# Patient Record
Sex: MALE | Race: WHITE | NOT HISPANIC OR LATINO | Employment: OTHER | URBAN - METROPOLITAN AREA
[De-identification: names, ages, dates, MRNs, and addresses within clinical notes are randomized per-mention and may not be internally consistent; named-entity substitution may affect disease eponyms.]

---

## 2017-01-13 ENCOUNTER — OFFICE VISIT (OUTPATIENT)
Dept: INFECTIOUS DISEASES | Facility: CLINIC | Age: 72
End: 2017-01-13
Payer: COMMERCIAL

## 2017-01-13 VITALS
HEART RATE: 80 BPM | WEIGHT: 235.69 LBS | HEIGHT: 69 IN | TEMPERATURE: 98 F | BODY MASS INDEX: 34.91 KG/M2 | DIASTOLIC BLOOD PRESSURE: 83 MMHG | SYSTOLIC BLOOD PRESSURE: 128 MMHG

## 2017-01-13 DIAGNOSIS — J18.9 RECURRENT PNEUMONIA: Primary | ICD-10-CM

## 2017-01-13 PROCEDURE — 99205 OFFICE O/P NEW HI 60 MIN: CPT | Mod: S$GLB,,, | Performed by: INTERNAL MEDICINE

## 2017-01-13 PROCEDURE — 1159F MED LIST DOCD IN RCRD: CPT | Mod: S$GLB,,, | Performed by: INTERNAL MEDICINE

## 2017-01-13 PROCEDURE — 99999 PR PBB SHADOW E&M-EST. PATIENT-LVL V: CPT | Mod: PBBFAC,,, | Performed by: INTERNAL MEDICINE

## 2017-01-13 PROCEDURE — 87385 HISTOPLASMA CAPSUL AG IA: CPT

## 2017-01-13 PROCEDURE — 1157F ADVNC CARE PLAN IN RCRD: CPT | Mod: S$GLB,,, | Performed by: INTERNAL MEDICINE

## 2017-01-13 PROCEDURE — 1125F AMNT PAIN NOTED PAIN PRSNT: CPT | Mod: S$GLB,,, | Performed by: INTERNAL MEDICINE

## 2017-01-13 PROCEDURE — 87449 NOS EACH ORGANISM AG IA: CPT

## 2017-01-13 PROCEDURE — 1160F RVW MEDS BY RX/DR IN RCRD: CPT | Mod: S$GLB,,, | Performed by: INTERNAL MEDICINE

## 2017-01-13 RX ORDER — FUROSEMIDE 40 MG/1
40 TABLET ORAL 2 TIMES DAILY
COMMUNITY

## 2017-01-13 RX ORDER — QUETIAPINE FUMARATE 100 MG/1
50 TABLET, FILM COATED ORAL
COMMUNITY

## 2017-01-13 RX ORDER — INSULIN GLARGINE 300 [IU]/ML
65 INJECTION, SOLUTION SUBCUTANEOUS NIGHTLY
COMMUNITY

## 2017-01-13 RX ORDER — DULOXETIN HYDROCHLORIDE 60 MG/1
60 CAPSULE, DELAYED RELEASE ORAL DAILY
COMMUNITY

## 2017-01-13 RX ORDER — ERGOCALCIFEROL 1.25 MG/1
50000 CAPSULE ORAL
COMMUNITY

## 2017-01-13 RX ORDER — METOPROLOL SUCCINATE 50 MG/1
50 TABLET, EXTENDED RELEASE ORAL 2 TIMES DAILY
COMMUNITY
End: 2017-08-14 | Stop reason: DRUGHIGH

## 2017-01-13 RX ORDER — POTASSIUM CHLORIDE 750 MG/1
20 TABLET, EXTENDED RELEASE ORAL DAILY
COMMUNITY

## 2017-01-13 RX ORDER — METOPROLOL TARTRATE 75 MG/1
50 TABLET, FILM COATED ORAL
COMMUNITY

## 2017-01-13 RX ORDER — PREDNISONE 10 MG/1
5 TABLET ORAL EVERY OTHER DAY
COMMUNITY

## 2017-01-13 RX ORDER — COLCHICINE 0.6 MG/1
0.6 TABLET ORAL 2 TIMES DAILY
COMMUNITY

## 2017-01-13 RX ORDER — CLOPIDOGREL BISULFATE 75 MG/1
75 TABLET ORAL DAILY
COMMUNITY

## 2017-01-13 RX ORDER — GLIMEPIRIDE 4 MG/1
4 TABLET ORAL
COMMUNITY

## 2017-01-13 RX ORDER — MEMANTINE HYDROCHLORIDE 28 MG/1
28 CAPSULE, EXTENDED RELEASE ORAL DAILY
COMMUNITY

## 2017-01-13 RX ORDER — ASPIRIN 81 MG/1
81 TABLET ORAL DAILY
COMMUNITY

## 2017-01-13 RX ORDER — GABAPENTIN 300 MG/1
300 CAPSULE ORAL 2 TIMES DAILY
COMMUNITY

## 2017-01-13 RX ORDER — AMOXICILLIN 500 MG
2 CAPSULE ORAL DAILY
COMMUNITY

## 2017-01-13 RX ORDER — CLONAZEPAM 1 MG/1
1 TABLET ORAL DAILY
COMMUNITY

## 2017-01-13 RX ORDER — NIFEDIPINE 30 MG/1
30 TABLET, EXTENDED RELEASE ORAL DAILY
COMMUNITY

## 2017-01-13 RX ORDER — FENOFIBRATE 160 MG/1
160 TABLET ORAL DAILY
COMMUNITY

## 2017-01-13 RX ORDER — CYCLOBENZAPRINE HCL 10 MG
10 TABLET ORAL 3 TIMES DAILY PRN
COMMUNITY

## 2017-01-13 RX ORDER — GALANTAMINE 12 MG/1
4 TABLET, FILM COATED ORAL 2 TIMES DAILY WITH MEALS
COMMUNITY

## 2017-01-13 RX ORDER — PANTOPRAZOLE SODIUM 40 MG/1
40 TABLET, DELAYED RELEASE ORAL DAILY
COMMUNITY

## 2017-01-13 RX ORDER — SOLIFENACIN SUCCINATE 10 MG/1
10 TABLET, FILM COATED ORAL DAILY
COMMUNITY

## 2017-01-13 RX ORDER — ATORVASTATIN CALCIUM 20 MG/1
20 TABLET, FILM COATED ORAL DAILY
COMMUNITY

## 2017-01-13 NOTE — LETTER
January 13, 2017      Shyanne Li MD  [address] [place] [address]  [place] [address]           [facility] - Infectious [facility]  [address]  [facility] [address]  Phone: [phone]  Fax: [phone]          Patient: Leo Hung   MR Number: [id number]   YOB: 1945   Date of Visit: 1/13/2017       Dear Dr. Shyanne Li:    Thank you for referring Leo Hung to me for evaluation. Attached you will find relevant portions of my assessment and plan of care.    If you have questions, please do not hesitate to call me. I look forward to following Leo Hung along with you.    Sincerely,    Carlos Barnard MD    Enclosure  CC:  No Recipients    If you would like to receive this communication electronically, please contact [email or web address] or [phone] to request more information on [employer] Link access.    For providers and/or their staff who would like to refer a patient to [facility], please contact us through our one-stop-shop provider referral line, [facility], at [phone].    If you feel you have received this communication in error or would no longer like to receive these types of communications, please e-mail [email or web address]

## 2017-01-13 NOTE — MR AVS SNAPSHOT
[facility]  [address]  [facility] [address]  Phone: [phone]  Fax: [phone]                  Leo Hung   2017 2:30 PM   Office Visit    Description:  Male : 1945   Provider:  Carlos Barnard MD   Department:  [facility]           Reason for Visit     Fever           Diagnoses this Visit        Comments    Recurrent pneumonia    -  Primary            To Do List           Future Appointments        Provider Department Dept Phone    2017 3:30 PM LAB, APPOINTMENT [place] [facility] [phone]      Goals (5 Years of Data)     None      [facility]     [facility] On [facility] Nurse Care Line -  Assistance  Registered nurses in the [facility] provide clinical advisement, health education, appointment booking, and other advisory services.  Call for this free service at [phone].             Medications                Verify that the below list of medications is an accurate representation of the medications you are currently taking.  If none reported, the list may be blank. If incorrect, please contact your healthcare provider. Carry this list with you in case of emergency.           Current Medications     aspirin (ECOTRIN) 81 MG EC tablet Take 81 mg by mouth once daily.    atorvastatin (LIPITOR) 20 MG tablet Take 20 mg by mouth once daily.    clonazePAM (KLONOPIN) 1 MG tablet Take 1 mg by mouth once daily.    clopidogrel (PLAVIX) 75 mg tablet Take 75 mg by mouth once daily.    colchicine 0.6 mg tablet Take 0.6 mg by mouth once daily.    cyclobenzaprine (FLEXERIL) 10 MG tablet Take 10 mg by mouth 3 (three) times daily as needed for Muscle spasms.    dulaglutide (TRULICITY) 1.5 mg/0.5 mL PnIj Inject into the skin.    duloxetine (CYMBALTA) 60 MG capsule Take 60 mg by mouth once daily.    ergocalciferol (VITAMIN D2) 50,000 unit Cap Take 50,000 Units by mouth every 7 days.    fenofibrate 160  "MG Tab Take 160 mg by mouth once daily.    fish oil-omega-3 fatty acids 300-1,000 mg capsule Take 2 g by mouth once daily.    furosemide (LASIX) 40 MG tablet Take 40 mg by mouth 2 (two) times daily.    gabapentin (NEURONTIN) 300 MG capsule Take 300 mg by mouth 3 (three) times daily.    galantamine (RAZADYNE) 12 MG Tab Take 4 mg by mouth 2 (two) times daily with meals.    glimepiride (AMARYL) 4 MG tablet Take 4 mg by mouth before breakfast.    insulin glargine, TOUJEO, (TOUJEO) 300 unit/mL (1.5 mL) InPn pen Inject 65 Units into the skin every evening.    memantine (NAMENDA XR) 28 mg CSpX Take 28 mg by mouth once daily.    metoprolol succinate (TOPROL-XL) 50 MG 24 hr tablet Take 50 mg by mouth once daily.    metoprolol tartrate 75 mg Tab Take 50 mg by mouth.    multivitamin capsule Take 1 capsule by mouth once daily.    nifedipine 30 MG ORAL TR24 (PROCARDIA-XL) 30 MG (OSM) 24 hr tablet Take 30 mg by mouth once daily.    pantoprazole (PROTONIX) 40 MG tablet Take 40 mg by mouth once daily.    potassium chloride (KLOR-CON) 10 MEQ TbSR Take 20 mEq by mouth once daily.    predniSONE (DELTASONE) 10 MG tablet Take 10 mg by mouth once daily.    quetiapine (SEROQUEL) 100 MG Tab Take 50 mg by mouth.    solifenacin (VESICARE) 10 MG tablet Take 10 mg by mouth once daily.           Clinical Reference Information           Vital Signs - Last Recorded  Most recent update: 1/13/2017  2:26 PM by Grace Gordon MA    BP Pulse Temp Ht Wt BMI    128/83 80 97.6 °F (36.4 °C) (Oral) 5' 8.5" (1.74 m) 106.9 kg (235 lb 10.8 oz) 35.31 kg/m2      Blood Pressure          Most Recent Value    BP  128/83      Allergies as of 1/13/2017     Allopurinol Analogues      Immunizations Administered on Date of Encounter - 1/13/2017     None      Orders Placed During Today's Visit      Normal Orders This Visit    Ambulatory consult to Pulmonology     Histoplasma antigen, urine     Future Labs/Procedures Expected by Expires    ASPERGILLUS (GALACTOMANNAN) AG, " SERUM  1/13/2017 3/14/2018    Blastomyces Antigen, Fluid Urine  1/13/2017 3/14/2018    Fungitell Assay For (1.3)-B-D-Glucans  1/13/2017 3/14/2018    Hepatitis B core antibody, total  1/13/2017 3/14/2018    Hepatitis B surface antibody  1/13/2017 1/13/2018    Hepatitis B surface antigen  1/13/2017 1/13/2018    Hepatitis C antibody  1/13/2017 1/13/2018    HIV-1 and HIV-2 antibodies  1/13/2017 1/13/2018    Quantiferon Gold TB  1/13/2017 1/13/2018      Maintenance Dialysis History     Patient has no recorded history of maintenance dialysis.      [facility] Sign-Up     Activating your [facility] account is as easy as 1-2-3!     1) Visit [email or web address], select Sign Up Now, enter this activation code and your date of birth, then select Next.  [email or web address]  Expires: 2/27/2017  3:26 PM      2) Create a username and password to use when you visit [facility] in the future and select a security question in case you lose your password and select Next.    3) Enter your e-mail address and click Sign Up!    Additional Information  If you have questions, please e-mail [email or web address] or call [phone] to talk to our [facility] staff. Remember, [facility] is NOT to be used for urgent needs. For medical emergencies, dial 911.

## 2017-01-13 NOTE — PROGRESS NOTES
"Infectious Diseases Clinic Note    Subjective:       Patient ID: Leo Hung is a 71 y.o. male.    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  No clear infection found has not had fevers since October.    From [place] worked as , lives with wife, no small kids around, has hogs cows, donkeys, chickens turkeys and has significant contact with all of them with significant outdoor exposure.    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, wife hunts and skins animals, last time they both skinned animals was in October      tspot negative  IGG WNL    Past Medical History   Diagnosis Date    MAJOR (acute kidney injury)     CAD (coronary artery disease)     CVA (cerebral vascular accident)     DM2 (diabetes mellitus, type 2)     Gout     HLD (hyperlipidemia)     HTN (hypertension)     ILD (interstitial lung disease)     RA (rheumatoid arthritis)        Social History     Social History    Marital status:      Spouse name: N/A    Number of children: N/A    Years of education: N/A     Occupational History          Social History Main Topics    Smoking status: Former Smoker    Smokeless tobacco: Not on file    Alcohol use No    Drug use: No    Sexual activity: Not on file     Other Topics Concern    Not on file     Social " History Narrative       No current outpatient prescriptions on file.    Review of Systems   Constitutional: Negative for activity change, appetite change, chills, fatigue and fever.   HENT: Negative for congestion, dental problem, mouth sores and sinus pressure.    Eyes: Negative for pain, redness and visual disturbance.   Respiratory: Negative for cough, shortness of breath and wheezing.    Cardiovascular: Negative for chest pain and leg swelling.   Gastrointestinal: Negative for abdominal distention, abdominal pain, diarrhea, nausea and vomiting.   Endocrine: Negative for polyuria.   Genitourinary: Negative for decreased urine volume, dysuria and frequency.   Musculoskeletal: Negative for joint swelling.   Skin: Negative for color change.   Allergic/Immunologic: Negative for food allergies.   Neurological: Negative for dizziness, weakness and headaches.   Hematological: Negative for adenopathy.   Psychiatric/Behavioral: Negative for agitation and confusion. The patient is not nervous/anxious.            Objective:      Vitals:    01/13/17 1425   BP: 128/83   Pulse: 80   Temp: 97.6 °F (36.4 °C)     Physical Exam   Constitutional: He is oriented to person, place, and time. He appears well-developed and well-nourished.   HENT:   Head: Normocephalic and atraumatic.   Mouth/Throat: Oropharynx is clear and moist.   Eyes: Conjunctivae are normal.   Neck: Neck supple.   Cardiovascular: Normal rate, regular rhythm and normal heart sounds.    No murmur heard.  Pulmonary/Chest: Effort normal. No respiratory distress. He has no wheezes.   Coarse breath sounds b/l   Abdominal: Soft. Bowel sounds are normal. He exhibits no distension. There is no tenderness.   Musculoskeletal: Normal range of motion. He exhibits no edema or tenderness.   Lymphadenopathy:     He has no cervical adenopathy.   Neurological: He is alert and oriented to person, place, and time. Coordination normal.   Skin: Skin is warm and dry. No rash noted.    Psychiatric: He has a normal mood and affect. His behavior is normal.           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), RA on pred 10 daily and leflunomide (held for past week) here for evaluation for infections (2 admissions for febrile syndromes one of which appears to clearly be PNA and has also been treated for PNA as outpatient) but has had no fevers since October 2016  - patient has chronic lung disease (2/2 ILD +/- toxic occupational exposures) and is at risk for recurrent PNA (flu and pneumonia vaccines up to date) and further he does have low level immunosuppresion with pred 10 and leflunomide (off of for past week) and OIs are possible but at this time given no recent illness, I think less likely  - will check indirect fungal markers   - I have told he and his wife I am happy to help discuss case with local rheum and pulm and he wants to see pulm here so referral has been made  - for further w/u related to infection I have told them that patient will likely need bronch if disease worsens  - I spent 30 minutes reviewing outside records prior to seeing patient

## 2017-01-17 LAB
HISTOPLASMA AG VALUE: 0 NG/ML
HISTOPLASMA ANTIGEN URINE: NEGATIVE

## 2017-01-18 LAB
BLASTOMYCES AG INTERP: NEGATIVE
BLASTOMYCES AG RESULT: NORMAL NG/ML
BLASTOMYCES AG SPECIMEN: NORMAL

## 2017-05-29 ENCOUNTER — HOSPITAL ENCOUNTER (OUTPATIENT)
Dept: PULMONOLOGY | Facility: CLINIC | Age: 72
Discharge: HOME | End: 2017-05-29
Payer: COMMERCIAL

## 2017-05-29 ENCOUNTER — OFFICE VISIT (OUTPATIENT)
Dept: PULMONOLOGY | Facility: CLINIC | Age: 72
End: 2017-05-29
Payer: COMMERCIAL

## 2017-05-29 VITALS — WEIGHT: 222 LBS | BODY MASS INDEX: 34.84 KG/M2 | HEIGHT: 67 IN

## 2017-05-29 VITALS
OXYGEN SATURATION: 95 % | DIASTOLIC BLOOD PRESSURE: 82 MMHG | HEART RATE: 67 BPM | WEIGHT: 231.69 LBS | SYSTOLIC BLOOD PRESSURE: 122 MMHG | HEIGHT: 69 IN | BODY MASS INDEX: 34.32 KG/M2

## 2017-05-29 DIAGNOSIS — J84.9 ILD (INTERSTITIAL LUNG DISEASE): ICD-10-CM

## 2017-05-29 DIAGNOSIS — J84.9 ILD (INTERSTITIAL LUNG DISEASE): Primary | ICD-10-CM

## 2017-05-29 DIAGNOSIS — J98.4 PNEUMONITIS: ICD-10-CM

## 2017-05-29 LAB
PRE FEV1 FVC: 71
PRE FEV1: 1.76
PRE FVC: 2.49
PREDICTED FEV1 FVC: 79
PREDICTED FEV1: 2.77
PREDICTED FVC: 3.48

## 2017-05-29 PROCEDURE — 94010 BREATHING CAPACITY TEST: CPT | Mod: 59,S$GLB,, | Performed by: INTERNAL MEDICINE

## 2017-05-29 PROCEDURE — 1125F AMNT PAIN NOTED PAIN PRSNT: CPT | Mod: S$GLB,,, | Performed by: INTERNAL MEDICINE

## 2017-05-29 PROCEDURE — 94620 PR PULMONARY STRESS TESTING,SIMPLE: CPT | Mod: S$GLB,,, | Performed by: INTERNAL MEDICINE

## 2017-05-29 PROCEDURE — 1159F MED LIST DOCD IN RCRD: CPT | Mod: S$GLB,,, | Performed by: INTERNAL MEDICINE

## 2017-05-29 PROCEDURE — 99999 PR PBB SHADOW E&M-EST. PATIENT-LVL V: CPT | Mod: PBBFAC,,, | Performed by: INTERNAL MEDICINE

## 2017-05-29 PROCEDURE — 99204 OFFICE O/P NEW MOD 45 MIN: CPT | Mod: S$GLB,,, | Performed by: INTERNAL MEDICINE

## 2017-05-29 PROCEDURE — 94727 GAS DIL/WSHOT DETER LNG VOL: CPT | Mod: S$GLB,,, | Performed by: INTERNAL MEDICINE

## 2017-05-29 PROCEDURE — 94729 DIFFUSING CAPACITY: CPT | Mod: S$GLB,,, | Performed by: INTERNAL MEDICINE

## 2017-05-29 NOTE — LETTER
May 30, 2017      Louie Goncalves MD  [address]  [address]  [place] [address]           [facility] - Pulmonary Services  [address]  [place] [address]  Phone: [phone]          Patient: Leo Hung   MR Number: [id number]   YOB: 1945   Date of Visit: 5/29/2017       Dear Dr. Louie Goncalves:    Thank you for referring Leo Hung to me for evaluation. Attached you will find relevant portions of my assessment and plan of care.    If you have questions, please do not hesitate to call me. I look forward to following Leo Hung along with you.    Sincerely,    Roberto Wilcox MD    Enclosure  CC:  No Recipients    If you would like to receive this communication electronically, please contact [email or web address] or [phone] to request more information on [employer] Link access.    For providers and/or their staff who would like to refer a patient to [facility], please contact us through our one-stop-shop provider referral line, [facility], at [phone].    If you feel you have received this communication in error or would no longer like to receive these types of communications, please e-mail [email or web address]

## 2017-05-29 NOTE — PROGRESS NOTES
Leo Hung  was seen as a new patient at the request  Louie Goncalves MD for the evaluation of  Recurring pneumonia.    CHIEF COMPLAINT:  Pneumonia (recurrent pneumonia)      HISTORY OF PRESENT ILLNESS: Leo Hung is a 71 y.o. male with pmh of dm2, hearing loss, cad, cva, RA, GERD is here for pulmonary evaluation.  Patient was diagnosed with ILD by Dr. Goncalves since 1/2000.  Patient was treated with po prednisone since 2000.  Currently on 10 mg daily.  S/p fob in 2000.  No h/o open lung biopsy.  Patient with multiple hospitalization since 2000.  Since 8/16 to 2 /17, patient with 5 hospitalization.  Per Dr. Li note, s/p lp, mri brain, ct of abdomen and pelvis that was unremarkable.      Per Dr. Goncalves, fob and immunoglobulin levels were non-diagnostic.  Currently on azithromycin since 3/17.  No hospitalization since 3/17.  Currently, patient denied fever/chills.  No chest pain.  No orthopnea.  +smothering sensation during sleep.  Patient was diagnosed with camryn but have not been compliant with cpap.  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  Chronically immunosuppressed and     PAST MEDICAL HISTORY:    Active Ambulatory Problems     Diagnosis Date Noted    ILD (interstitial lung disease)      Resolved Ambulatory Problems     Diagnosis Date Noted    No Resolved Ambulatory Problems     Past Medical History:   Diagnosis Date    MAJOR (acute kidney injury)     CAD (coronary artery disease)     CVA (cerebral vascular accident)     DM2 (diabetes mellitus, type 2)     Gout     Hearing loss     HLD (hyperlipidemia)     HTN (hypertension)     ILD (interstitial lung disease)     RA (rheumatoid arthritis)                 PAST SURGICAL HISTORY:    Past Surgical History:   Procedure Laterality Date    anal abscess      CHOLECYSTECTOMY      CORONARY STENT PLACEMENT      HERNIA REPAIR           FAMILY HISTORY:                Family  History   Problem Relation Age of Onset    Cancer Mother     Hyperlipidemia Mother     Hypertension Mother     Diabetes Father     Heart disease Father     Hyperlipidemia Father     Hypertension Father     Stroke Father        SOCIAL HISTORY:          Tobacco:   History   Smoking Status    Former Smoker   Smokeless Tobacco    Not on file     alcohol use:    History   Alcohol Use No               Occupation:  Farmer    ALLERGIES:    Review of patient's allergies indicates:   Allergen Reactions    Allopurinol analogues        CURRENT MEDICATIONS:    Current Outpatient Prescriptions   Medication Sig Dispense Refill    aspirin (ECOTRIN) 81 MG EC tablet Take 81 mg by mouth once daily.      atorvastatin (LIPITOR) 20 MG tablet Take 20 mg by mouth once daily.      clonazePAM (KLONOPIN) 1 MG tablet Take 1 mg by mouth once daily.      clopidogrel (PLAVIX) 75 mg tablet Take 75 mg by mouth once daily.      colchicine 0.6 mg tablet Take 0.6 mg by mouth once daily.      cyclobenzaprine (FLEXERIL) 10 MG tablet Take 10 mg by mouth 3 (three) times daily as needed for Muscle spasms.      dulaglutide (TRULICITY) 1.5 mg/0.5 mL PnIj Inject into the skin.      duloxetine (CYMBALTA) 60 MG capsule Take 60 mg by mouth once daily.      ergocalciferol (VITAMIN D2) 50,000 unit Cap Take 50,000 Units by mouth every 7 days.      fenofibrate 160 MG Tab Take 160 mg by mouth once daily.      fish oil-omega-3 fatty acids 300-1,000 mg capsule Take 2 g by mouth once daily.      furosemide (LASIX) 40 MG tablet Take 40 mg by mouth 2 (two) times daily.      gabapentin (NEURONTIN) 300 MG capsule Take 300 mg by mouth 3 (three) times daily.      galantamine (RAZADYNE) 12 MG Tab Take 4 mg by mouth 2 (two) times daily with meals.      glimepiride (AMARYL) 4 MG tablet Take 4 mg by mouth before breakfast.      insulin glargine, TOUJEO, (TOUJEO) 300 unit/mL (1.5 mL) InPn pen Inject 65 Units into the skin every evening.      memantine  "(NAMENDA XR) 28 mg CSpX Take 28 mg by mouth once daily.      metoprolol succinate (TOPROL-XL) 50 MG 24 hr tablet Take 50 mg by mouth once daily.      metoprolol tartrate 75 mg Tab Take 50 mg by mouth.      multivitamin capsule Take 1 capsule by mouth once daily.      nifedipine 30 MG ORAL TR24 (PROCARDIA-XL) 30 MG (OSM) 24 hr tablet Take 30 mg by mouth once daily.      pantoprazole (PROTONIX) 40 MG tablet Take 40 mg by mouth once daily.      potassium chloride (KLOR-CON) 10 MEQ TbSR Take 20 mEq by mouth once daily.      predniSONE (DELTASONE) 10 MG tablet Take 10 mg by mouth once daily.      quetiapine (SEROQUEL) 100 MG Tab Take 50 mg by mouth.      solifenacin (VESICARE) 10 MG tablet Take 10 mg by mouth once daily.       No current facility-administered medications for this visit.                   REVIEW OF SYSTEMS:     Pulmonary related symptoms as per HPI.  Gen:  no weight loss, no fever, no night sweat  HEENT:  no visual changes, no sore throat, + hearing loss  CV:  Per hpi  GI:  no melena, no hematochezia, no diarhea, no constipation.  :  no dysuria, no hematuria, no hesistancy, no dribbling  Neuro:  no syncope, +intermittent vertigo, no tinitus  Psych:  No homocide or suicide ideation; no depression.  Endocrine:  No heat or cold intolerance.  Sleep:  camryn per hpi  Otherwise, a balance of systems reviewed is negative.          PHYSICAL EXAM:  Vitals:    05/29/17 1013   BP: 122/82   Pulse: 67   SpO2: 95%   Weight: 105.1 kg (231 lb 11.3 oz)   Height: 5' 8.5" (1.74 m)   PainSc:   6   PainLoc: Generalized     Body mass index is 34.72 kg/m².     GENERAL:  well develop; no apparent distress  HEENT:  no nasal congestion; no discharge noted; class 4 modified mallampatti.   NECK:  supple; no palpable masses.  CARDIO: regular rate and rhythm  PULM:  clear to auscultation bilaterally; no intercostals retractions; no accessory muscle usage   ABDOMEN:  soft nontender/nondistended.  +bowel sound  EXTREMITIES no " cce  NEURO:  CN II-XII intact.  5/5 motor in all extremities.  sensation grossly intact   to light touch.  PSYCH:  normal affect.  Alert and oriented x 4    LABS  Pulmonary Functions Testing Results: none  ABG none  CXR:  none  CT CHEST:  10/5/16 from [facility]; septal thickening; no lad.  +mild honeycombing or rml anteririor    hiv 1/13/17 negative    1/13/17 Bastomyces/histoplasma urine antigen negative  Echo 10/3/16 at [facility] cardiology 6/3/16 normal ef; lvg; +diastolic dysfunction  dobutrex redriver 8/2/06negative    ASSESSMENT    ICD-10-CM ICD-9-CM    1. ILD (interstitial lung disease) J84.9 515 Stress test, pulmonary      DLCO-Carbon Monoxide Diffusing Capacity      LUNG VOLUMES      Spirometry without Bronchodilator      Fungal Precipitins (Hypersensitivity PneumonitisI)      Humoral Immune Eval (Pneumo 14) with H. flu      IgG 1, 2, 3, and 4      Immunoglobulins (IgG, IgA, IgM) Quantitative   2. Pneumonitis J18.9 486        PLAN:  ild - last ct 10/5/16 appear to improve when compared to ct 8/16  Will get baseline pft, 6 min walk.  RA-ILD vs HP vs idiopathic.  S/p fob 8/16.  Will scan images to epic.      Recurrent pneumonia - +fever/chills and worsening sob.  Last episode was 3/16.  Will send for hp, immunoglobulins level, and pneumonia titers.  May consider repeat fob due to immunosuppressant state with chronic prednisone.  Off Leflunimide by Dr. Li, Rheumatologist.          Patient will Return in about 3 months (around 8/29/2017). with md.    CC: Send copy of this note to Louie Goncalves MD

## 2017-05-30 NOTE — PROCEDURES
Leo Hung is a 71 y.o.  male patient, who presents for a 6 minute walk test ordered by MD. Jeri.  The diagnosis is Shortness of Breath.  The patient's BMI is 34.8 kg/m2.  Predicted distance (lower limit of normal) is 305.97 meters.      Test Results:    The test was completed without stopping.    The total time walked was 360 seconds.  During walking, the patient reported:  Dyspnea, Leg pain, Lightheadedness. The patient used no assistive devices  during testing.     5/29/2017-------Distance: 231.04 meters (758 feet)     O2 Sat % Supplemental Oxygen Heart Rate Blood Pressure Frankie Scale   Pre-exercise  (Resting) 95 % Room Air 70 bpm 135/89 mmHg 5-6   During Exercise 91 % Room Air 75 bpm 137/90 mmHg 5-6   Post-exercise  (Recovery) 95 % Room Air  71 bpm   mmHg       Recovery Time: 70 seconds    Performing nurse/tech: DEBBIE Nicole      PREVIOUS STUDY:   The patient has not had a previous study.      CLINICAL INTERPRETATION:  Six minute walk distance is 231.04 meters (758 feet) with heavy dyspnea.  During exercise, there was significant desaturation while breathing room air.  Both blood pressure and heart rate remained stable with walking.  The patient reported non-pulmonary symptoms during exercise.  Significant exercise impairment is likely due to subjective symptoms.  No previous study performed.  Based upon age and body mass index, exercise capacity is less than predicted.

## 2017-06-12 ENCOUNTER — TELEPHONE (OUTPATIENT)
Dept: SLEEP MEDICINE | Facility: OTHER | Age: 72
End: 2017-06-12

## 2017-06-12 DIAGNOSIS — J18.9 PNEUMONIA DUE TO INFECTIOUS ORGANISM, UNSPECIFIED LATERALITY, UNSPECIFIED PART OF LUNG: Primary | ICD-10-CM

## 2017-06-12 NOTE — TELEPHONE ENCOUNTER
Result of labs d/w patient.  Recent pft and ct are unremarkable for significant parenchymal lung disease.  Hypersensitivity pannel wnl.  Immunoglobulins level are wnl.  However, pneumococcal titter are not protected on 9 of the 14 serotypes.  Per wife, recent pneumonia vaccine in 11/2016.  Will refer to immunology for evaluation of possible immunodeficiency as an explaination for recurring pneumonia.  Please schedule allergy/immunology appointment.

## 2017-07-17 ENCOUNTER — OFFICE VISIT (OUTPATIENT)
Dept: PULMONOLOGY | Facility: CLINIC | Age: 72
End: 2017-07-17
Payer: COMMERCIAL

## 2017-07-17 ENCOUNTER — OFFICE VISIT (OUTPATIENT)
Dept: ALLERGY | Facility: CLINIC | Age: 72
End: 2017-07-17
Payer: COMMERCIAL

## 2017-07-17 VITALS
OXYGEN SATURATION: 93 % | BODY MASS INDEX: 37.16 KG/M2 | DIASTOLIC BLOOD PRESSURE: 82 MMHG | HEIGHT: 67 IN | SYSTOLIC BLOOD PRESSURE: 114 MMHG | HEART RATE: 93 BPM | WEIGHT: 236.75 LBS

## 2017-07-17 VITALS
WEIGHT: 236.75 LBS | DIASTOLIC BLOOD PRESSURE: 68 MMHG | OXYGEN SATURATION: 92 % | SYSTOLIC BLOOD PRESSURE: 100 MMHG | HEIGHT: 68 IN | HEART RATE: 78 BPM | BODY MASS INDEX: 35.88 KG/M2

## 2017-07-17 DIAGNOSIS — E11.9 TYPE 2 DIABETES MELLITUS WITHOUT COMPLICATION, WITH LONG-TERM CURRENT USE OF INSULIN: ICD-10-CM

## 2017-07-17 DIAGNOSIS — J84.9 ILD (INTERSTITIAL LUNG DISEASE): ICD-10-CM

## 2017-07-17 DIAGNOSIS — J06.9 RECURRENT URI (UPPER RESPIRATORY INFECTION): Primary | ICD-10-CM

## 2017-07-17 DIAGNOSIS — M06.9 RHEUMATOID ARTHRITIS, INVOLVING UNSPECIFIED SITE, UNSPECIFIED RHEUMATOID FACTOR PRESENCE: ICD-10-CM

## 2017-07-17 DIAGNOSIS — Z79.4 TYPE 2 DIABETES MELLITUS WITHOUT COMPLICATION, WITH LONG-TERM CURRENT USE OF INSULIN: ICD-10-CM

## 2017-07-17 DIAGNOSIS — J84.9 ILD (INTERSTITIAL LUNG DISEASE): Primary | ICD-10-CM

## 2017-07-17 DIAGNOSIS — I25.10 CORONARY ARTERY DISEASE, ANGINA PRESENCE UNSPECIFIED, UNSPECIFIED VESSEL OR LESION TYPE, UNSPECIFIED WHETHER NATIVE OR TRANSPLANTED HEART: ICD-10-CM

## 2017-07-17 PROCEDURE — 3008F BODY MASS INDEX DOCD: CPT | Mod: S$GLB,,, | Performed by: ALLERGY & IMMUNOLOGY

## 2017-07-17 PROCEDURE — 1126F AMNT PAIN NOTED NONE PRSNT: CPT | Mod: S$GLB,,, | Performed by: ALLERGY & IMMUNOLOGY

## 2017-07-17 PROCEDURE — 99999 PR PBB SHADOW E&M-EST. PATIENT-LVL III: CPT | Mod: PBBFAC,,, | Performed by: INTERNAL MEDICINE

## 2017-07-17 PROCEDURE — 1159F MED LIST DOCD IN RCRD: CPT | Mod: S$GLB,,, | Performed by: INTERNAL MEDICINE

## 2017-07-17 PROCEDURE — 99214 OFFICE O/P EST MOD 30 MIN: CPT | Mod: S$GLB,,, | Performed by: INTERNAL MEDICINE

## 2017-07-17 PROCEDURE — 99214 OFFICE O/P EST MOD 30 MIN: CPT | Mod: S$GLB,,, | Performed by: ALLERGY & IMMUNOLOGY

## 2017-07-17 PROCEDURE — 1159F MED LIST DOCD IN RCRD: CPT | Mod: S$GLB,,, | Performed by: ALLERGY & IMMUNOLOGY

## 2017-07-17 PROCEDURE — 99999 PR PBB SHADOW E&M-EST. PATIENT-LVL V: CPT | Mod: PBBFAC,,, | Performed by: ALLERGY & IMMUNOLOGY

## 2017-07-17 PROCEDURE — 1126F AMNT PAIN NOTED NONE PRSNT: CPT | Mod: S$GLB,,, | Performed by: INTERNAL MEDICINE

## 2017-07-17 NOTE — PATIENT INSTRUCTIONS
Xolair candidate possibility to wean off oral prednisone v. IgG replacement,  pending lab results  Dr. Riley in [place] as local consult

## 2017-07-17 NOTE — PROGRESS NOTES
Leo Hung  was seen as a follow up.    CHIEF COMPLAINT:  Interstitial Lung Disease      HISTORY OF PRESENT ILLNESS: Leo Hung is a 72 y.o. male with pmh of dm2, hearing loss, cad, cva, RA, GERD is here for pulmonary evaluation.  Patient was diagnosed with ILD by Dr. Goncalves since 1/2000.  Patient was treated with po prednisone since 2000.  Currently on 10 mg daily.  S/p fob in 2000.  No h/o open lung biopsy.  Patient with multiple hospitalization since 2000.  Since 8/16 to 2 /17, patient with 5 hospitalization.  Per Dr. Li note, s/p lp, mri brain, ct of abdomen and pelvis that was unremarkable.      Per Dr. Goncalves, fob and immunoglobulin levels were non-diagnostic.  Patient was treated with azithromycin since 3/17.  No hospitalization since 3/17.  Currently, patient denied fever/chills.  No chest pain.  No orthopnea.  +smothering sensation during sleep.  Patient was diagnosed with camryn but have not been compliant with cpap.  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  Chronically immunosuppressed with prednisone.      Last appointment with me was 5/29/17.  Since last appointment, patient was hospitalized from 7/5/17 to 7/10/17 with fever/chills and non-productive cough.  Patient was switched to IV levofloxacin and meropenem.  WBC at admit was 15.5.  Fever/chills improved during hospitalization. During last clinic appointment, immunoglobulins level wnl.  HP was wnl.  Pneumococcal titers were low.      PAST MEDICAL HISTORY:    Active Ambulatory Problems     Diagnosis Date Noted    ILD (interstitial lung disease)     RA (rheumatoid arthritis) 07/17/2017    DM2 (diabetes mellitus, type 2) 07/17/2017    CAD (coronary artery disease) 07/17/2017     Resolved Ambulatory Problems     Diagnosis Date Noted    No Resolved Ambulatory Problems     Past Medical History:   Diagnosis Date    MAJOR (acute kidney injury)     CAD (coronary  artery disease)     CVA (cerebral vascular accident)     DM2 (diabetes mellitus, type 2)     Gout     Hearing loss     HLD (hyperlipidemia)     HTN (hypertension)     ILD (interstitial lung disease)     RA (rheumatoid arthritis)                 PAST SURGICAL HISTORY:    Past Surgical History:   Procedure Laterality Date    anal abscess      CHOLECYSTECTOMY      CORONARY STENT PLACEMENT      HERNIA REPAIR           FAMILY HISTORY:                Family History   Problem Relation Age of Onset    Cancer Mother     Hyperlipidemia Mother     Hypertension Mother     Diabetes Father     Heart disease Father     Hyperlipidemia Father     Hypertension Father     Stroke Father        SOCIAL HISTORY:          Tobacco:   History   Smoking Status    Former Smoker   Smokeless Tobacco    Never Used     alcohol use:    History   Alcohol Use No               Occupation:  Farmer    ALLERGIES:    Review of patient's allergies indicates:   Allergen Reactions    Allopurinol analogues        CURRENT MEDICATIONS:    Current Outpatient Prescriptions   Medication Sig Dispense Refill    aspirin (ECOTRIN) 81 MG EC tablet Take 81 mg by mouth once daily.      atorvastatin (LIPITOR) 20 MG tablet Take 20 mg by mouth once daily.      clonazePAM (KLONOPIN) 1 MG tablet Take 1 mg by mouth once daily.      clopidogrel (PLAVIX) 75 mg tablet Take 75 mg by mouth once daily.      colchicine 0.6 mg tablet Take 0.6 mg by mouth once daily.      cyclobenzaprine (FLEXERIL) 10 MG tablet Take 10 mg by mouth 3 (three) times daily as needed for Muscle spasms.      dulaglutide (TRULICITY) 1.5 mg/0.5 mL PnIj Inject into the skin once a week.       duloxetine (CYMBALTA) 60 MG capsule Take 60 mg by mouth once daily.      ergocalciferol (VITAMIN D2) 50,000 unit Cap Take 50,000 Units by mouth every 7 days.      fenofibrate 160 MG Tab Take 160 mg by mouth once daily.      fish oil-omega-3 fatty acids 300-1,000 mg capsule Take 2 g by  mouth once daily.      furosemide (LASIX) 40 MG tablet Take 40 mg by mouth 2 (two) times daily.      gabapentin (NEURONTIN) 300 MG capsule Take 300 mg by mouth 2 (two) times daily. One in the am and three at bedtime      galantamine (RAZADYNE) 12 MG Tab Take 4 mg by mouth 2 (two) times daily with meals.      glimepiride (AMARYL) 4 MG tablet Take 4 mg by mouth before breakfast.      insulin glargine, TOUJEO, (TOUJEO) 300 unit/mL (1.5 mL) InPn pen Inject 65 Units into the skin every evening.      memantine (NAMENDA XR) 28 mg CSpX Take 28 mg by mouth once daily.      metoprolol succinate (TOPROL-XL) 50 MG 24 hr tablet Take 50 mg by mouth 2 (two) times daily.       metoprolol tartrate 75 mg Tab Take 50 mg by mouth.      multivitamin capsule Take 1 capsule by mouth once daily.      nifedipine 30 MG ORAL TR24 (PROCARDIA-XL) 30 MG (OSM) 24 hr tablet Take 30 mg by mouth once daily.      pantoprazole (PROTONIX) 40 MG tablet Take 40 mg by mouth once daily.      potassium chloride (KLOR-CON) 10 MEQ TbSR Take 20 mEq by mouth once daily.      predniSONE (DELTASONE) 10 MG tablet Take 10 mg by mouth once daily.      quetiapine (SEROQUEL) 100 MG Tab Take 50 mg by mouth.      solifenacin (VESICARE) 10 MG tablet Take 10 mg by mouth once daily.      tiotropium bromide 2.5 mcg/actuation Mist Inhale 1 puff into the lungs once daily at 6am. Controller 4 g 6     No current facility-administered medications for this visit.                   REVIEW OF SYSTEMS:     Pulmonary related symptoms as per HPI.  Gen:  no weight loss, no fever, no night sweat  HEENT:  no visual changes, no sore throat, + hearing loss  CV:  Per hpi  GI:  no melena, no hematochezia, no diarhea, no constipation.  :  no dysuria, no hematuria, no hesistancy, no dribbling  Neuro:  no syncope, +intermittent vertigo, no tinitus  Psych:  No homocide or suicide ideation; no depression.  Endocrine:  No heat or cold intolerance.  Sleep:  camryn per hpi  Otherwise,  "a balance of systems reviewed is negative.          PHYSICAL EXAM:  Vitals:    07/17/17 1330   BP: 114/82   Pulse: 93   SpO2: (!) 93%   Weight: 107.4 kg (236 lb 12.4 oz)   Height: 5' 7" (1.702 m)   PainSc: 0-No pain     Body mass index is 37.08 kg/m².     GENERAL:  well develop; no apparent distress  HEENT:  no nasal congestion; no discharge noted; class 4 modified mallampatti.   NECK:  supple; no palpable masses.  CARDIO: regular rate and rhythm  PULM:  clear to auscultation bilaterally; no intercostals retractions; no accessory muscle usage   ABDOMEN:  soft nontender/nondistended.  +bowel sound  EXTREMITIES no cce  NEURO:  CN II-XII intact.  5/5 motor in all extremities.  sensation grossly intact   to light touch.  PSYCH:  normal affect.  Alert and oriented x 4    LABS  Pulmonary Functions Testing Results: pft 5/29/17 ratio 71%; fvc 64%; fev1 72%; tlc 81%; dlco 78%  6 min walk 231 95-91-95 on room air  ABG none  CXR:  none  CT CHEST:  10/5/16 [facility]; septal thickening; no lad.  +mild honeycombing or rml anteririor  Hypersensitivity panel 5/29/17 wnl  hiv 1/13/17 negative    1/13/17 Bastomyces/histoplasma urine antigen negative  E[facility] cardiology 6/3/16 normal ef; lvg; +diastolic dysfunction  dobutrex redriver 8/2/06negative    ASSESSMENT    ICD-10-CM ICD-9-CM    1. ILD (interstitial lung disease) J84.9 515        PLAN:  ild - last ct 10/5/16 appear to improve when compared to ct 8/16.  Most recent CT 7/17 without evidence of worsening.  pft with mild restriction.  RA-ILD vs idiopathic.  Last fob 8/16.  Await immunology inputs.    Recurrent pneumonia - +fever/chills and worsening sob.  Last episode was 3/16.  Will send for hp, immunoglobulins level, and pneumonia titers.  May consider repeat fob due to immunosuppressant state with chronic prednisone.  Off Leflunimide by Dr. Li, Rheumatologist.          Patient will Return in about 3 months (around 10/17/2017). with " md.

## 2017-07-17 NOTE — LETTER
July 17, 2017      Roberto Wilcox MD  [address]  [facility] [address]           [facility]  [address]  [facility] [address]  Phone: [phone]  Fax: [phone]          Patient: Leo Hung   MR Number: [id number]   YOB: 1945   Date of Visit: 7/17/2017       Dear Dr. Roberto Wilcox:    Thank you for referring Leo Hung to me for evaluation. Attached you will find relevant portions of my assessment and plan of care.    If you have questions, please do not hesitate to call me. I look forward to following Leo Hung along with you.    Sincerely,    Mandi Sorensen, [facility]    Enclosure  CC:  No Recipients    If you would like to receive this communication electronically, please contact [email or web address] or [phone] to request more information on [employer] access.    For providers and/or their staff who would like to refer a patient to [facility], please contact us through our one-stop-shop provider referral line, [facility], at [phone].    If you feel you have received this communication in error or would no longer like to receive these types of communications, please e-mail [email or web address]

## 2017-07-17 NOTE — PROGRESS NOTES
"Subjective:       Patient ID: Leo Hung is a 72 y.o. male.    Chief Complaint: Consult (diagnosis of "interstitial lung disease' referred by Dr. Wilcox.  has had pneumonia twice since June. 6 times since last August.  Hospitalized in [place], 7-5-17 until last week.  Questionable as to whether he has an autoimmune problem)    Patient is new to me in the [facility] system. He presents with referral for evaluation of immune function. He has had pneumonia twice since June 2016 with hospitalization. Worked at [employer] with history of chemical exposure. His condition is complicated by history of chemical exposure and toxic asbestosis secondary with diagnosed with interstitial lung disease. He is up to date with his vaccinations for Prevnar, shingles, and tetanus, per report. Immune studies work up may show poor response to immunizations due to daily dose of prednisone at 10 mg qd.       Review of Systems   Constitutional: Negative.  Negative for activity change, appetite change, chills, diaphoresis, fatigue, fever and unexpected weight change.   HENT: Negative.  Negative for congestion, dental problem, drooling, ear discharge, ear pain, facial swelling, hearing loss, mouth sores, nosebleeds, postnasal drip, rhinorrhea, sinus pressure, sneezing, sore throat, tinnitus, trouble swallowing and voice change.    Eyes: Negative.  Negative for photophobia, pain, discharge, redness, itching and visual disturbance.   Respiratory: Negative.  Negative for apnea, cough, choking, chest tightness, shortness of breath, wheezing and stridor.    Cardiovascular: Negative.  Negative for chest pain, palpitations and leg swelling.   Gastrointestinal: Negative.  Negative for abdominal distention, abdominal pain, constipation, diarrhea, nausea and vomiting.   Endocrine: Negative.  Negative for cold intolerance, heat intolerance, polydipsia, polyphagia and polyuria.   Genitourinary: Negative.  Negative for decreased urine " volume, difficulty urinating, dysuria, enuresis, frequency and urgency.   Musculoskeletal: Negative.  Negative for arthralgias, back pain, gait problem, joint swelling, myalgias, neck pain and neck stiffness.   Skin: Negative.  Negative for color change, pallor, rash and wound.   Allergic/Immunologic: Positive for immunocompromised state. Negative for environmental allergies and food allergies.        Recurrent URI. Pneumonia x2   Neurological: Negative.  Negative for dizziness, tremors, seizures, syncope, facial asymmetry, speech difficulty, weakness, light-headedness, numbness and headaches.   Hematological: Negative.  Negative for adenopathy. Does not bruise/bleed easily.   Psychiatric/Behavioral: Negative.  Negative for agitation, behavioral problems, confusion, decreased concentration, dysphoric mood, hallucinations, self-injury, sleep disturbance and suicidal ideas. The patient is not nervous/anxious and is not hyperactive.      Objective:   Physical Exam   Constitutional: He is oriented to person, place, and time. He appears well-developed and well-nourished. He is active and cooperative.  Non-toxic appearance. He does not have a sickly appearance. He does not appear ill. No distress.   HENT:   Head: Normocephalic and atraumatic. Head is without abrasion, without right periorbital erythema and without left periorbital erythema.   Right Ear: Hearing, tympanic membrane, external ear and ear canal normal. No lacerations. No drainage, swelling or tenderness. No foreign bodies. No mastoid tenderness. Tympanic membrane is not injected, not scarred, not perforated, not erythematous, not retracted and not bulging. Tympanic membrane mobility is normal. No middle ear effusion. No decreased hearing is noted.   Left Ear: Hearing, tympanic membrane, external ear and ear canal normal. No lacerations. No drainage, swelling or tenderness. No foreign bodies. No mastoid tenderness. Tympanic membrane is not injected, not  scarred, not perforated, not erythematous, not retracted and not bulging. Tympanic membrane mobility is normal.  No middle ear effusion. No decreased hearing is noted.   Nose: Nose normal. No mucosal edema, rhinorrhea, nose lacerations, sinus tenderness, nasal deformity, septal deviation or nasal septal hematoma. No epistaxis.  No foreign bodies. Right sinus exhibits no maxillary sinus tenderness and no frontal sinus tenderness. Left sinus exhibits no maxillary sinus tenderness and no frontal sinus tenderness.   Mouth/Throat: Uvula is midline, oropharynx is clear and moist and mucous membranes are normal. He does not have dentures. No oral lesions. No trismus in the jaw. No dental abscesses, uvula swelling, lacerations or dental caries. No oropharyngeal exudate, posterior oropharyngeal edema, posterior oropharyngeal erythema or tonsillar abscesses. No tonsillar exudate.   Eyes: Conjunctivae, EOM and lids are normal. Pupils are equal, round, and reactive to light. Right eye exhibits no chemosis, no discharge and no exudate. Left eye exhibits no chemosis, no discharge and no exudate. Right conjunctiva is not injected. Right conjunctiva has no hemorrhage. Left conjunctiva is not injected. Left conjunctiva has no hemorrhage. No scleral icterus.   Neck: Trachea normal, normal range of motion, full passive range of motion without pain and phonation normal. No tracheal tenderness and no muscular tenderness present. No neck rigidity. No tracheal deviation, no edema, no erythema and normal range of motion present. No thyroid mass and no thyromegaly present.   Cardiovascular: Normal rate, regular rhythm, normal heart sounds and normal pulses.  Exam reveals no gallop and no friction rub.    No murmur heard.  Pulmonary/Chest: Effort normal and breath sounds normal. No accessory muscle usage or stridor. No apnea, no tachypnea and no bradypnea. He has no decreased breath sounds. He has no wheezes. He has no rhonchi. He has no  rales. He exhibits no tenderness.   Abdominal: Soft. Normal appearance and bowel sounds are normal. He exhibits no distension. There is no tenderness. There is no rigidity, no rebound, no guarding and no CVA tenderness.   Musculoskeletal: Normal range of motion. He exhibits no edema, tenderness or deformity.   Lymphadenopathy:        Head (right side): No submental, no submandibular, no tonsillar, no preauricular, no posterior auricular and no occipital adenopathy present.        Head (left side): No submental, no submandibular, no tonsillar, no preauricular, no posterior auricular and no occipital adenopathy present.     He has no cervical adenopathy.        Right cervical: No superficial cervical, no deep cervical and no posterior cervical adenopathy present.       Left cervical: No superficial cervical, no deep cervical and no posterior cervical adenopathy present.        Right: No supraclavicular and no epitrochlear adenopathy present.        Left: No supraclavicular and no epitrochlear adenopathy present.   Neurological: He is alert and oriented to person, place, and time. He has normal strength. He is not disoriented. No cranial nerve deficit. He exhibits normal muscle tone. Coordination and gait normal.   Skin: Skin is warm and dry. No abrasion, no bruising, no laceration, no lesion, no petechiae, no purpura and no rash noted. Rash is not macular, not papular, not maculopapular, not nodular, not pustular, not vesicular and not urticarial. He is not diaphoretic. No cyanosis or erythema. No pallor. Nails show no clubbing.   Psychiatric: He has a normal mood and affect. His speech is normal and behavior is normal. Judgment and thought content normal. His mood appears not anxious. His affect is not inappropriate. Cognition and memory are normal. He does not express impulsivity or inappropriate judgment. He expresses no homicidal and no suicidal ideation. He is attentive.   Nursing note and vitals  reviewed.    Assessment:     1. Recurrent URI (upper respiratory infection)    2. ILD (interstitial lung disease)    3. Rheumatoid arthritis, involving unspecified site, unspecified rheumatoid factor presence    4. Type 2 diabetes mellitus without complication, with long-term current use of insulin    5. Coronary artery disease, angina presence unspecified, unspecified vessel or lesion type, unspecified whether native or transplanted heart      Plan:   Leo was seen today for consult.    Diagnoses and all orders for this visit:    Recurrent URI (upper respiratory infection)  -     CBC auto differential; Future  -     Comprehensive metabolic panel; Future  -     IgE; Future  -     IgG 1, 2, 3, and 4; Future  -     C-reactive protein; Future  -     Diphtheria Toxoid IgG Antibodies; Future  -     Haemophilius influenzae B Ab IgG; Future  -     Immunoglobulins (IgG, IgA, IgM) Quantitative; Future  -     S.pneumoniae IgG Serotypes; Future  -     Tetanus toxoid, IgG; Future  -     LYMPHOCYTE PROLILE II; Future  -     NEUTROPHIL OXIDATIVE BURST, BLOOD; Future  -     Protein electrophoresis, serum; Future    ILD (interstitial lung disease)  -     tiotropium bromide 2.5 mcg/actuation Mist; Inhale 1 puff into the lungs once daily at 6am. Controller    Rheumatoid arthritis, involving unspecified site, unspecified rheumatoid factor presence    Type 2 diabetes mellitus without complication, with long-term current use of insulin    Coronary artery disease, angina presence unspecified, unspecified vessel or lesion type, unspecified whether native or transplanted heart      Return in about 3 weeks (around 8/7/2017) for Review Lab work.    Discussed options and strategies  Reviewed medications risk v. Benefit  Reviewed pathophysiology  All questions answered  Emotional support provided  Pt verbalized understanding of all the above and treatment plan.      PETER Green

## 2017-08-14 PROBLEM — R76.8 ELEVATED IGE LEVEL: Status: ACTIVE | Noted: 2017-08-14

## 2017-08-14 PROBLEM — J06.9 RECURRENT URI (UPPER RESPIRATORY INFECTION): Status: ACTIVE | Noted: 2017-08-14

## 2017-08-22 ENCOUNTER — TELEPHONE (OUTPATIENT)
Dept: ALLERGY | Facility: CLINIC | Age: 72
End: 2017-08-22

## 2017-09-13 ENCOUNTER — OFFICE VISIT (OUTPATIENT)
Dept: ALLERGY | Facility: CLINIC | Age: 72
End: 2017-09-13
Payer: COMMERCIAL

## 2017-09-13 VITALS
HEIGHT: 68 IN | OXYGEN SATURATION: 93 % | SYSTOLIC BLOOD PRESSURE: 138 MMHG | HEART RATE: 95 BPM | BODY MASS INDEX: 35.68 KG/M2 | DIASTOLIC BLOOD PRESSURE: 74 MMHG | WEIGHT: 235.44 LBS

## 2017-09-13 DIAGNOSIS — D80.6 SPECIFIC ANTIBODY DEFICIENCY WITH NORMAL IG CONCENTRATION AND NORMAL NUMBER OF B CELLS: ICD-10-CM

## 2017-09-13 DIAGNOSIS — R76.8 ELEVATED IGE LEVEL: ICD-10-CM

## 2017-09-13 DIAGNOSIS — J31.0 OTHER CHRONIC RHINITIS: Primary | ICD-10-CM

## 2017-09-13 PROCEDURE — 99214 OFFICE O/P EST MOD 30 MIN: CPT | Mod: S$GLB,,, | Performed by: PEDIATRICS

## 2017-09-13 PROCEDURE — 99999 PR PBB SHADOW E&M-EST. PATIENT-LVL III: CPT | Mod: PBBFAC,,, | Performed by: PEDIATRICS

## 2017-09-13 PROCEDURE — 1159F MED LIST DOCD IN RCRD: CPT | Mod: S$GLB,,, | Performed by: PEDIATRICS

## 2017-09-13 PROCEDURE — 3008F BODY MASS INDEX DOCD: CPT | Mod: S$GLB,,, | Performed by: PEDIATRICS

## 2017-09-13 RX ORDER — LEVOFLOXACIN 750 MG/1
750 TABLET ORAL DAILY
COMMUNITY
Start: 2017-09-09 | End: 2017-09-16

## 2017-09-13 NOTE — PROGRESS NOTES
ALLERGY & IMMUNOLOGY CLINIC - FOLLOW UP     HISTORY OF PRESENT ILLNESS     Patient ID: Leo Hung is a 72 y.o. male    CC: Immune Eval for recurrent PNA    HPI:     Recurrent PNA:  Mr Hung has had recurrent pneumonias over the past year up to 9 of these episodes. Of note was last admitted to [facility] from 8/31-9/9 for failed outpatient treatment for R lobar PNA. During that admission the patient was noted to have bacteremia, protein in his CSF, as well as had a neurologic evaluation during the visit however the patient did not come with the complete records from this discharge. The patient since this most recent admission for which he required IV Abx. Of note he had Pnevnar-13 in October of 2017 and appears to have had a poor response to the PNA vaccine per his last allergy note.    ILD:  Patient has been on chronic steroids for the past 20 years. Currently he is on Prednison 10 mg PO daily. His ILD is thought to be secondary to occupational exposure. He does not seem to respond to Albuterol and does not need or use it. He had a recent PFT during which post bronchodilator response was not recorded. Of note per the patient and his wife he has never had a CT of his chest.    Leg Pain:   The patient notes that over the past year he has had recurrent pitting edema and leg pain. That seems to be getting worse. He is seen by a Rheumatologist for his RA but says that the pain is worsening lately.      Pneumovax       REVIEW OF SYSTEMS     CONST: no F/C/NS, no unintentional weight changes  NEURO: no H/A, + weakness, no paresthesias  EYES: no discharge, no pruritus, no erythema  EARS: no hearing loss, no sensation of fullness  NOSE: no congestion, no rhinorrhea, no discharge, no itching, no sneezing  PULM: + SOB, no wheezing, no cough, no snoring  CV: no CP, no palpitations, + intermittent leg swelling  GI: no dysphagia, no heartburn, no pain, no N/V/D, no BRBPR/melena  URO: no dysuria, no hematuria, no  "nocturia  MSK: Constant leg pain, no muscle pain  DERM: no rashes, no skin breaks     MEDICAL HISTORY     MedHx: active problems reviewed  SocHx: Lives at home with wife  Allergies: see below  Medications: MAR reviewed  Vaccines: Prevnar Oct 2016    H/o Asthma: Denies  H/o Eczema: Denies  H/o Rhinitis: Intermittent  Oral Allergy: Denies  Food Allergy: Denies  Venom Allergy: Denies  Latex Allergy: Denies  Other Allergies: Denies  Env/Occ: Denies any evironmental or occupational exposures     PHYSICAL EXAM     VS: /74 (BP Location: Left arm, Patient Position: Sitting, BP Method: Medium (Manual))   Pulse 95   Ht 5' 8" (1.727 m)   Wt 106.8 kg (235 lb 7.2 oz)   SpO2 (!) 93%   BMI 35.80 kg/m²   GENERAL: , NAD, mildly ill-appearing, cooperative  EYES: PERRL, EOMI, mild conjunctival injection, no discharge, no infraorbital shiners  EARS: external auditory canals normal B/L, TM normal B/L  NOSE: NT 2+ and pink B/L, some stringing mucous, no polyps  ORAL: MMM, no ulcers, no thrush, no cobblestoning  NECK: supple, trachea midline, no thyromegaly, no LAD  LUNGS: Intermittent rales with some crackles in lower lung bases, no increased WOB  HEART: Heart sounds muffled by body habitus but no murmur notes  ABDOMEN: BS present, soft, non-tender, non-distended, no HSM  EXTREMITIES: +2 distal pulses, 1+ edema in legs b/l  LYMPHATICS: no cervical/submandibular LAD, no axillary LAD, no inguinal LAD  DERM: no rashes, skin break thin on arms b/l, no dystrophic fingernails  NEURO: Difficulty ambulating without, no facial asymmetry     LABORATORY STUDIES     Labs from 7/17/17  CBC auto differential    Ref Range & Units 1mo ago   WBC 3.90 - 12.70 K/uL 9.16    RBC 4.60 - 6.20 M/uL 4.93    Hemoglobin 14.0 - 18.0 g/dL 15.0    Hematocrit 40.0 - 54.0 % 46.3    MCV 82 - 98 fL 94    MCH 27.0 - 31.0 pg 30.4    MCHC 32.0 - 36.0 % 32.4    RDW 11.5 - 14.5 % 16.5     Platelets 150 - 350 K/uL 301    MPV 9.2 - 12.9 fL 9.9    Gran # 1.8 - 7.7 " K/uL 5.1    Lymph # 1.0 - 4.8 K/uL 2.7    Mono # 0.3 - 1.0 K/uL 1.1     Eos # 0.0 - 0.5 K/uL 0.2    Baso # 0.00 - 0.20 K/uL 0.03    Gran% 38.0 - 73.0 % 55.7    Lymph% 18.0 - 48.0 % 29.4    Mono% 4.0 - 15.0 % 11.8    Eosinophil% 0.0 - 8.0 % 1.7    Basophil% 0.0 - 1.9 % 0.3    Differential Method   Automated            Comprehensive metabolic panel    Ref Range & Units 1mo ago   Sodium 136 - 145 mmol/L 143    Potassium 3.5 - 5.1 mmol/L 4.4    Chloride 95 - 110 mmol/L 106    CO2 23 - 29 mmol/L 29    Glucose 70 - 110 mg/dL 84    BUN, Bld 8 - 23 mg/dL 14    Creatinine 0.5 - 1.4 mg/dL 1.0    Calcium 8.7 - 10.5 mg/dL 8.9    Total Protein 6.0 - 8.4 g/dL 7.7    Albumin 3.5 - 5.2 g/dL 3.4     Total Bilirubin 0.1 - 1.0 mg/dL 0.4       Alkaline Phosphatase 55 - 135 U/L 87    AST 10 - 40 U/L 78     ALT 10 - 44 U/L 130     Anion Gap 8 - 16 mmol/L 8    eGFR if African American >60 mL/min/1.73 m^2 >60.0    eGFR if non African American >60 mL/min/1.73 m^2 >60.0            IgE    Ref Range & Units 1mo ago   IgE 0 - 100 IU/mL 9454             Immunoglobulins (IgG, IgA, IgM) Quantitative      Ref Range & Units 1mo ago   IgG - Serum 650 - 1600 mg/dL 1371    Comments: IgG Cord Blood Reference Range: 650-1600 mg/dL.   IgA 40 - 350 mg/dL 555     Comments: IgA Cord Blood Reference Range: <5 mg/dL.   IgM 50 - 300 mg/dL 115    Comments: IgM Cord Blood Reference Range: <25 mg/dL.             IgG 1, 2, 3, and 4   Order: [id number]   Status:  Final result   Visible to patient:  No (Not Released)   Next appt:  10/18/2017 at 10:20 AM in Lab ([facility])   Dx:  Recurrent URI (upper respiratory infe...    Ref Range & Units 1mo ago   IgG 1 382 - 929 mg/dL 755    IgG 2 242 - 700 mg/dL 368    IgG 3 22 - 176 mg/dL 98    IgG 4 4 - 86 mg/dL 29    Comments: Test performed at [facility],   [address], [place]  [address] [phone]   Dk Canales MD  - Medical Director    Resulting Agency  [facility]      Specimen Collected:  07/17/17 16:49   Last Resulted: 07/19/17 05:29                  Protein electrophoresis, serum    Ref Range & Units 1mo ago   Protein, Serum 6.0 - 8.4 g/dL 7.5    Albumin grams/dl 3.35 - 5.55 g/dL 3.77    Alpha-1 grams/dl 0.17 - 0.41 g/dL 0.37    Alpha-2 grams/dl 0.43 - 0.99 g/dL 0.86    Beta grams/dl 0.50 - 1.10 g/dL 1.00    Gamma grams/dl 0.67 - 1.58 g/dL 1.51    Resulting Agency  [facility]      Specimen Collected: 07/17/17 16:49   Last Resulted: 07/18/17 12:32              S.pneumoniae IgG Serotypes    Ref Range & Units 1mo ago   S.pneumoniae Type 1 mcg/mL 6.0    S.pneumoniae Type 3 mcg/mL 7.2    Strep pneumo Type 4 mcg/mL 1.0    S.pneumoniae Type 5 mcg/mL 6.2    S.pneumoniae Type 8 mcg/mL 0.5    S.pneumoniae Type 9N mcg/mL 1.3    S.pneumoniae Type 12F mcg/mL 1.3    Strep pneumo Type 14 mcg/mL 14.6    S.pneumoniae Type 19F mcg/mL 4.3    S.pneumoniae Type 23F mcg/mL 0.8    S.pneumoniae Type 6B mcg/mL 5.0    S.pneumoniae Type 7F mcg/mL 2.1    S.pneumoniae Type 18C mcg/mL 3.5    S.pneumoniae Type 9V Abs mcg/mL 1.8            LYMPHOCYTE PROLILE II      Ref Range & Units 1mo ago   CD3 % Total T Cell 55 - 83 % 76.0    Absolute CD3 700 - 2100 cells/ul 2223     CD8 % Suppressor T Cell 10 - 39 % 38.2    Absolute CD8 200 - 900 cells/ul 1118     CD4 % [place] T Cell 28 - 57 % 38.7    Absolute CD4 300 - 1400 cells/ul 1131    CD4/CD8 Ratio 0.9 - 3.6 1.01    CD56 + CD16 [place] 7 - 31 % 17.0    Absolute CD56 + CD16 90 - 600 cells/ul 485    CD19 B Cells 6 - 19 % 4.5     Absolute CD19 100 - 500 cells/ul 128            DHR Test: Normal    Tetanus toxoid, IgG    Ref Range & Units 1mo ago   Tetanus Toxoid IgG Ab   Positive    Comments: -------------------REFERENCE VALUE--------------------------   Vaccinated: Positive (>= 0.01 IU/mL)   Unvaccinated: Negative (< 0.01 IU/mL)    Tetanus Toxoid IgG Value IU/mL >2.24                    ALLERGEN TESTING     Immunocaps: Ordered Today     PULMONARY FUNCTION     PFTs:  5/29/17  FVC 2.49 L  (72%)  FEV1 1.76 L (645%)  FEF 25-75 1.21 L/S (43%)  DLCO 19 (78%)    Interp:  Mild (small airways) obstruction. Nitrogen washout lung volumes are normal.  Diffusion capacity is mildly decreased, would recommend with pre and post bronchodilator     CHART REVIEW     Reviewed previous notes from Pulm/Allergy as well as previous imaging     ASSESSMENT & PLAN     Leo Hung is a 72 y.o. male with elevated IgE, recurrent PNA, ILD, and increasing leg pain    ILD  -Patient has been following the Pulm here and in [place]  -The patient needs to be weaned off of Prednisone as he is suffering many of the sequellae of long term prednisone therapy  -Would recommend pre and post bronchodilator PFTs to see if patient has reversibility to his lung disease  -Would recommend high resolution CT to determine if there is underlying anatomical defect of the lung parenchyma. This should be managed by the patient's pulmonary team    Recurrent PNA  -Patient with poor response to previous pneumococal vaccine.   -Repeat today with Pneumovax  -Will re-check titers in 6 weeks  -At that time will also re-check IgE  -His recurrent infections are likely due to long standing exogenous immunosupression with long term Prednisone. At this point he should be weaned off of it.   -He could benefit from exogenous IgG should these infections continue we will re-evaluate if this occurs    Elevated IgE  -Patient has elevated IgE we will re-check this in 6 weeks when we check the sIgE to common aeroallergens  -The patient's history and workup are negative for parasitic infection  -History is not consistent with hyper IgE Syndrome so this diagnosis is unlikely  -Will re-check in 6 weeks  -Patient's CBC does not show values consistent with peripheral eosinophilia however he is on long term immunosuppression so that may be due to his Prednisone.   -The patient should continue to get routine CBCs to check for underlying malignancy in the setting of  elevated IgE although at this time the diagnosis is unlikely based on lab testing    Leg Pain  -Patient had increasing leg pain today This is likely due to edema and possibly due to insidious fracture with bony resorption due to long term steroid use. We encouraged the patient to follow with his PCP for this and would likely be of benefit to enroll in a physical therapy program. 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 Coronado

## 2019-06-11 ENCOUNTER — OUTSIDE PLACE OF SERVICE (OUTPATIENT)
Dept: PHYSICAL MEDICINE AND REHAB | Facility: CLINIC | Age: 74
End: 2019-06-11
Payer: COMMERCIAL

## 2019-06-11 PROCEDURE — 99223 1ST HOSP IP/OBS HIGH 75: CPT | Mod: ,,, | Performed by: PHYSICAL MEDICINE & REHABILITATION

## 2019-06-11 PROCEDURE — 99223 PR INITIAL HOSPITAL CARE,LEVL III: ICD-10-PCS | Mod: ,,, | Performed by: PHYSICAL MEDICINE & REHABILITATION

## 2019-06-12 ENCOUNTER — OUTSIDE PLACE OF SERVICE (OUTPATIENT)
Dept: PHYSICAL MEDICINE AND REHAB | Facility: CLINIC | Age: 74
End: 2019-06-12
Payer: COMMERCIAL

## 2019-06-12 PROCEDURE — 99232 PR SUBSEQUENT HOSPITAL CARE,LEVL II: ICD-10-PCS | Mod: ,,, | Performed by: PHYSICAL MEDICINE & REHABILITATION

## 2019-06-12 PROCEDURE — 99232 SBSQ HOSP IP/OBS MODERATE 35: CPT | Mod: ,,, | Performed by: PHYSICAL MEDICINE & REHABILITATION

## 2019-06-13 ENCOUNTER — OUTSIDE PLACE OF SERVICE (OUTPATIENT)
Dept: PHYSICAL MEDICINE AND REHAB | Facility: CLINIC | Age: 74
End: 2019-06-13
Payer: COMMERCIAL

## 2019-06-13 ENCOUNTER — OUTSIDE PLACE OF SERVICE (OUTPATIENT)
Dept: PHYSICAL MEDICINE AND REHAB | Facility: CLINIC | Age: 74
End: 2019-06-13

## 2019-06-13 PROCEDURE — 99232 PR SUBSEQUENT HOSPITAL CARE,LEVL II: ICD-10-PCS | Mod: ,,, | Performed by: PHYSICAL MEDICINE & REHABILITATION

## 2019-06-13 PROCEDURE — 99232 SBSQ HOSP IP/OBS MODERATE 35: CPT | Mod: ,,, | Performed by: PHYSICAL MEDICINE & REHABILITATION

## 2019-06-14 ENCOUNTER — OUTSIDE PLACE OF SERVICE (OUTPATIENT)
Dept: PHYSICAL MEDICINE AND REHAB | Facility: CLINIC | Age: 74
End: 2019-06-14
Payer: COMMERCIAL

## 2019-06-14 PROCEDURE — 99232 SBSQ HOSP IP/OBS MODERATE 35: CPT | Mod: ,,, | Performed by: PHYSICAL MEDICINE & REHABILITATION

## 2019-06-14 PROCEDURE — 99232 PR SUBSEQUENT HOSPITAL CARE,LEVL II: ICD-10-PCS | Mod: ,,, | Performed by: PHYSICAL MEDICINE & REHABILITATION

## 2019-06-15 ENCOUNTER — OUTSIDE PLACE OF SERVICE (OUTPATIENT)
Dept: PHYSICAL MEDICINE AND REHAB | Facility: CLINIC | Age: 74
End: 2019-06-15
Payer: COMMERCIAL

## 2019-06-15 PROCEDURE — 99238 PR HOSPITAL DISCHARGE DAY,<30 MIN: ICD-10-PCS | Mod: ,,, | Performed by: PHYSICAL MEDICINE & REHABILITATION

## 2019-06-15 PROCEDURE — 99238 HOSP IP/OBS DSCHRG MGMT 30/<: CPT | Mod: ,,, | Performed by: PHYSICAL MEDICINE & REHABILITATION

## 2019-06-26 ENCOUNTER — OFFICE VISIT (OUTPATIENT)
Dept: PHYSICAL MEDICINE AND REHAB | Facility: CLINIC | Age: 74
End: 2019-06-26
Payer: COMMERCIAL

## 2019-06-26 VITALS
WEIGHT: 213.81 LBS | DIASTOLIC BLOOD PRESSURE: 78 MMHG | HEIGHT: 68 IN | SYSTOLIC BLOOD PRESSURE: 140 MMHG | TEMPERATURE: 97 F | HEART RATE: 52 BPM | BODY MASS INDEX: 32.4 KG/M2

## 2019-06-26 DIAGNOSIS — G93.40 ENCEPHALOPATHY ACUTE: ICD-10-CM

## 2019-06-26 DIAGNOSIS — R53.1 ASTHENIA: Primary | ICD-10-CM

## 2019-06-26 DIAGNOSIS — Z91.81 AT MODERATE RISK FOR FALL: ICD-10-CM

## 2019-06-26 DIAGNOSIS — Z74.09 IMPAIRED MOBILITY: ICD-10-CM

## 2019-06-26 DIAGNOSIS — J96.20 ACUTE ON CHRONIC RESPIRATORY FAILURE, UNSPECIFIED WHETHER WITH HYPOXIA OR HYPERCAPNIA: ICD-10-CM

## 2019-06-26 PROCEDURE — 99213 PR OFFICE/OUTPT VISIT, EST, LEVL III, 20-29 MIN: ICD-10-PCS | Mod: S$GLB,,, | Performed by: NURSE PRACTITIONER

## 2019-06-26 PROCEDURE — 99213 OFFICE O/P EST LOW 20 MIN: CPT | Mod: S$GLB,,, | Performed by: NURSE PRACTITIONER

## 2019-06-26 NOTE — PROGRESS NOTES
Subjective:       Patient ID: Leo Hung is a 74 y.o. male.    Chief Complaint:    HPI     73-year-old white male admitted to [facility] 6/3/2019 with complaints of increasing shortness of breath weakness and confusion.  Evaluated by neurology Dr. Molina.  EEG revealed diffuse slowing suggested diffuse encephalopathic process.  CT scan and MRI scan of the brain revealed no acute changes.  Lumbar puncture performed.  HSV antibody titer negative other serology pending.  Evaluated by infectious disease, Dr. Winter who recommended starting the patient on acyclovir.  Awaiting other CSF serology reports, possible meningoencephalitis.. Comorbidities include COPD obstructive sleep apnea congestive heart failure coronary artery disease with prior history of myocardial infarction, diabetes mellitus type 2 with hyperglycemia, hypertension, hyperlipidemia, history of dementia.  Living at home with his spouse active independent household environment prior to onset of present illness.  Able to participate with all therapies, although struggling to regain prior level of independent function.  Requires continued intensive interdisciplinary inpatient rehabilitation with close medical monitoring during remobilization..       Review of Systems   Constitutional: Negative for activity change, appetite change, chills, fatigue and fever.   Respiratory: Negative for apnea, cough, chest tightness, shortness of breath and wheezing.    Cardiovascular: Negative for chest pain, palpitations and leg swelling.   Gastrointestinal: Negative for abdominal distention, abdominal pain, blood in stool, constipation, diarrhea and vomiting.   Genitourinary: Negative for difficulty urinating, dysuria, flank pain, frequency and urgency.   Musculoskeletal: Negative for arthralgias, back pain, gait problem, joint swelling and neck pain.   Skin: Negative for color change, pallor and rash.   Neurological: Negative for dizziness, tremors,  syncope, weakness, light-headedness and numbness.   Hematological: Negative for adenopathy. Does not bruise/bleed easily.   Psychiatric/Behavioral: Negative for agitation, confusion and sleep disturbance. The patient is not nervous/anxious.        Objective:      Physical Exam   Constitutional: He is oriented to person, place, and time. Vital signs are normal. He appears well-developed and well-nourished.   HENT:   Head: Normocephalic and atraumatic.   Mouth/Throat: Oropharynx is clear and moist.   Eyes: Pupils are equal, round, and reactive to light. Conjunctivae are normal.   Neck: Trachea normal and normal range of motion. Neck supple. Carotid bruit is not present.   Cardiovascular: Normal rate, regular rhythm and normal heart sounds.   Pulmonary/Chest: Effort normal and breath sounds normal.   Abdominal: Soft. Bowel sounds are normal.   Musculoskeletal: Normal range of motion.   Neurological: He is alert and oriented to person, place, and time.   Skin: Skin is warm, dry and intact.   Psychiatric: He has a normal mood and affect. His speech is normal and behavior is normal. Judgment normal.       Assessment:       1. Asthenia    2. Encephalopathy acute    3. Acute on chronic respiratory failure, unspecified whether with hypoxia or hypercapnia    4. Impaired mobility    5. At moderate risk for fall        Plan:       PROBLEM RAMA Bailey was seen today for follow-up.    Diagnoses and all orders for this visit:    Asthenia    Encephalopathy acute    Acute on chronic respiratory failure, unspecified whether with hypoxia or hypercapnia    Impaired mobility    At moderate risk for fall

## 2019-06-26 NOTE — PROGRESS NOTES
Subjective:       Patient ID: Leo Hung is a 74 y.o. male.    Chief Complaint: Follow-up (Rehab F/u)    HPI       73-year-old white male admitted to [facility] 6/3/2019 with complaints of increasing shortness of breath weakness and confusion.  Evaluated by neurology Dr. Molina.  EEG revealed diffuse slowing suggested diffuse encephalopathic process.  CT scan and MRI scan of the brain revealed no acute changes.  Lumbar puncture performed.  HSV antibody titer negative other serology pending.  Evaluated by infectious disease, Dr. Winter who recommended starting the patient on acyclovir.  Awaiting other CSF serology reports, possible meningoencephalitis.. Comorbidities include COPD obstructive sleep apnea congestive heart failure coronary artery disease with prior history of myocardial infarction, diabetes mellitus type 2 with hyperglycemia, hypertension, hyperlipidemia, history of dementia.  He was living at home with his spouse, fairly active & independent household environment prior to onset of present illness.  He was able to participate with all therapies, although struggled to regain prior level of independent function.  Required 5 days of continued intensive interdisciplinary inpatient rehabilitation with close medical monitoring during remobilization. He has transitioned to home w/ wife. Able to complete some ADLs, but still required moderate assistance from spouse. Reports improvement in both strength and physical capacity. He has continued his PT/OT through  who are working w/ him 2-3 times weekly. Only complaint today is the arthritis in his knees.     Review of Systems   Constitutional: Positive for fatigue. Negative for activity change, appetite change, chills and fever.   Respiratory: Negative for apnea, cough, chest tightness, shortness of breath and wheezing.    Cardiovascular: Negative for chest pain, palpitations and leg swelling.   Gastrointestinal: Negative for abdominal  distention, abdominal pain, blood in stool, constipation, diarrhea and vomiting.   Genitourinary: Negative for difficulty urinating, dysuria, flank pain, frequency and urgency.   Musculoskeletal: Positive for arthralgias and gait problem. Negative for back pain, joint swelling and neck pain.   Skin: Negative for color change, pallor and rash.   Neurological: Negative for dizziness, tremors, syncope, weakness, light-headedness and numbness.   Hematological: Negative for adenopathy. Does not bruise/bleed easily.   Psychiatric/Behavioral: Negative for agitation, confusion and sleep disturbance. The patient is not nervous/anxious.        Objective:      Physical Exam   Constitutional: He is oriented to person, place, and time. Vital signs are normal. He appears well-developed and well-nourished.   Chronically-ill in appearance      HENT:   Head: Normocephalic and atraumatic.   Mouth/Throat: Oropharynx is clear and moist.   edentulous   Eyes: Pupils are equal, round, and reactive to light. Conjunctivae are normal.   Neck: Trachea normal and normal range of motion. Neck supple. Carotid bruit is not present.   Cardiovascular: Normal rate, regular rhythm and normal heart sounds.   Pulmonary/Chest: Effort normal and breath sounds normal.   Abdominal: Soft. Bowel sounds are normal.   Musculoskeletal: Normal range of motion.   Neurological: He is alert and oriented to person, place, and time. He displays atrophy. He displays no tremor. He exhibits abnormal muscle tone. Gait abnormal. Coordination normal.   Shuffling/ antalgic gait   Skin: Skin is warm, dry and intact.   Psychiatric: He has a normal mood and affect. His speech is normal and behavior is normal. Judgment normal.       Assessment:       1. Asthenia    2. Encephalopathy acute    3. Acute on chronic respiratory failure, unspecified whether with hypoxia or hypercapnia    4. Impaired mobility    5. At moderate risk for fall        Plan:     Able to complete some ADLs,  but still required moderate assistance from spouse. Reports improvement in both strength and physical capacity. He has continued his PT/OT through [facility] who are working w/ him 2-3 times weekly. Only complaint today is the arthritis in his knees. Do not want pain to interfere w/ his PT/OT--recommended diclofenac gel (which he has rx for) or OTC lidocaine 4% gel patches  *

## 2019-06-26 NOTE — PROGRESS NOTES
Subjective:       Patient ID: Leo Hung is a 74 y.o. male.    Chief Complaint: No chief complaint on file.    HPI   73-year-old white male admitted to [facility] 6/3/2019 with complaints of increasing shortness of breath weakness and confusion.  Evaluated by neurology Dr. Molina.  EEG revealed diffuse slowing suggested diffuse encephalopathic process.  CT scan and MRI scan of the brain revealed no acute changes.  Lumbar puncture performed.  HSV antibody titer negative other serology pending.  Evaluated by infectious disease, Dr. Winter who recommended starting the patient on acyclovir.  Awaiting other CSF serology reports, possible meningoencephalitis.. Comorbidities include COPD obstructive sleep apnea congestive heart failure coronary artery disease with prior history of myocardial infarction, diabetes mellitus type 2 with hyperglycemia, hypertension, hyperlipidemia, history of dementia.  Living at home with his spouse active independent household environment prior to onset of present illness.  Able to participate with all therapies, although struggling to regain prior level of independent function.  Requires continued intensive interdisciplinary inpatient rehabilitation with close medical monitoring during remobilization..  Review of Systems    Objective:      Physical Exam    Assessment:       No diagnosis found.    Plan:       ***

## 2019-07-01 ENCOUNTER — OFFICE VISIT (OUTPATIENT)
Dept: FAMILY MEDICINE | Facility: CLINIC | Age: 74
End: 2019-07-01
Payer: COMMERCIAL

## 2019-07-01 VITALS
SYSTOLIC BLOOD PRESSURE: 112 MMHG | WEIGHT: 215 LBS | HEART RATE: 56 BPM | RESPIRATION RATE: 16 BRPM | DIASTOLIC BLOOD PRESSURE: 68 MMHG | BODY MASS INDEX: 32.58 KG/M2 | OXYGEN SATURATION: 98 % | TEMPERATURE: 99 F | HEIGHT: 68 IN

## 2019-07-01 DIAGNOSIS — G93.40 ENCEPHALOPATHY: Primary | ICD-10-CM

## 2019-07-01 PROCEDURE — 99213 PR OFFICE/OUTPT VISIT, EST, LEVL III, 20-29 MIN: ICD-10-PCS | Mod: S$GLB,,, | Performed by: INTERNAL MEDICINE

## 2019-07-01 PROCEDURE — 99213 OFFICE O/P EST LOW 20 MIN: CPT | Mod: S$GLB,,, | Performed by: INTERNAL MEDICINE

## 2019-07-01 NOTE — PROGRESS NOTES
Subjective:       Patient ID: Leo Hung is a 74 y.o. male.    Chief Complaint: HSFU (acute encephalopathy 2nd to hypoxia. States he's been doing ok)    Patient is here for follow-up from recent hospitalization.  He was admitted on June 3rd with acute encephalopathy.  He underwent lumbar puncture that showed slightly elevated protein but normal glucose.  Cell count was fairly normal as well.  He was treated empirically for viral encephalitis, however all viral testing has returned negative.  Condition has improved somewhat, however wife still reports some increased drowsiness and weakness.  He is scheduled to follow with Neurology next week.  No fever or chills since returning home.    Review of Systems   Constitutional: Positive for fatigue. Negative for chills and fever.   Respiratory: Negative for cough and shortness of breath.    Cardiovascular: Negative for chest pain and palpitations.   Gastrointestinal: Negative for diarrhea, nausea and vomiting.   Musculoskeletal: Positive for gait problem (Due to weakness, using walker).   Psychiatric/Behavioral: Positive for confusion (Improving).       Objective:      Physical Exam   Constitutional: He appears well-developed and well-nourished.   Ambulating with walker   HENT:   Mouth/Throat: Oropharynx is clear and moist.   Cardiovascular: Normal rate, regular rhythm and normal heart sounds.   No murmur heard.  Pulmonary/Chest: Effort normal and breath sounds normal. No respiratory distress. He has no wheezes.   Abdominal: Soft. Bowel sounds are normal. He exhibits no distension. There is no tenderness. There is no guarding.   Neurological: He is alert.   Skin: Skin is warm and dry. No rash noted.   Psychiatric: He has a normal mood and affect.   Nursing note and vitals reviewed.      Assessment:       1. Encephalopathy        Plan:       Problem List Items Addressed This Visit        Neuro    Encephalopathy - Primary     Improving, still not at baseline.  No  clear infectious etiology has been identified.  All labs are negative thus far.  Recommend close neurology follow-up.  Follow up here as needed.

## 2019-07-01 NOTE — ASSESSMENT & PLAN NOTE
Improving, still not at baseline.  No clear infectious etiology has been identified.  All labs are negative thus far.  Recommend close neurology follow-up.  Follow up here as needed.

## 2019-11-30 PROBLEM — I63.9 CVA (CEREBRAL VASCULAR ACCIDENT): Status: ACTIVE | Noted: 2019-11-30

## 2019-12-01 PROBLEM — I10 HTN (HYPERTENSION): Status: ACTIVE | Noted: 2019-12-01

## 2019-12-01 PROBLEM — E78.5 HLD (HYPERLIPIDEMIA): Status: ACTIVE | Noted: 2019-12-01

## 2019-12-01 PROBLEM — R50.9 FEVER: Status: ACTIVE | Noted: 2019-12-01

## 2019-12-02 PROBLEM — I63.239 CAROTID STENOSIS, SYMPTOMATIC, WITH INFARCTION: Status: ACTIVE | Noted: 2019-12-02

## 2019-12-04 PROBLEM — Z91.89 AT HIGH RISK FOR ASPIRATION: Status: ACTIVE | Noted: 2019-12-04

## 2019-12-05 PROBLEM — E87.6 HYPOKALEMIA: Status: ACTIVE | Noted: 2019-12-05

## 2019-12-08 PROBLEM — T83.511A URINARY TRACT INFECTION ASSOCIATED WITH INDWELLING URETHRAL CATHETER: Status: ACTIVE | Noted: 2019-12-08

## 2019-12-08 PROBLEM — Z79.899 DVT PROPHYLAXIS: Status: ACTIVE | Noted: 2019-12-08

## 2019-12-08 PROBLEM — N39.0 URINARY TRACT INFECTION ASSOCIATED WITH INDWELLING URETHRAL CATHETER: Status: ACTIVE | Noted: 2019-12-08

## 2019-12-08 PROBLEM — J69.0 ASPIRATION PNEUMONIA: Status: ACTIVE | Noted: 2019-12-08

## 2019-12-09 PROBLEM — G47.00 INSOMNIA: Status: ACTIVE | Noted: 2019-12-09

## 2019-12-09 PROBLEM — R50.9 FEVER: Status: RESOLVED | Noted: 2019-12-01 | Resolved: 2019-12-09

## 2019-12-09 PROBLEM — N30.00 ACUTE CYSTITIS WITHOUT HEMATURIA: Status: ACTIVE | Noted: 2019-12-09

## 2019-12-09 PROBLEM — I63.512 ACUTE ISCHEMIC LEFT MIDDLE CEREBRAL ARTERY (MCA) STROKE: Status: ACTIVE | Noted: 2019-11-30

## 2019-12-15 PROBLEM — N30.00 ACUTE CYSTITIS WITHOUT HEMATURIA: Status: RESOLVED | Noted: 2019-12-09 | Resolved: 2019-12-15

## 2019-12-15 PROBLEM — E87.6 HYPOKALEMIA: Status: RESOLVED | Noted: 2019-12-05 | Resolved: 2019-12-15

## 2019-12-17 PROBLEM — Z97.8 INDWELLING FOLEY CATHETER PRESENT: Status: ACTIVE | Noted: 2019-12-17

## 2019-12-17 PROBLEM — Z91.89 AT RISK FOR ASPIRATION: Status: RESOLVED | Noted: 2019-12-04 | Resolved: 2019-12-17

## 2019-12-17 PROBLEM — I63.512 ACUTE ISCHEMIC LEFT MIDDLE CEREBRAL ARTERY (MCA) STROKE: Status: RESOLVED | Noted: 2019-11-30 | Resolved: 2019-12-17

## 2019-12-17 PROBLEM — E11.59 TYPE 2 DIABETES MELLITUS WITH CIRCULATORY DISORDER, WITHOUT LONG-TERM CURRENT USE OF INSULIN: Status: ACTIVE | Noted: 2019-12-17

## 2021-05-12 ENCOUNTER — PATIENT MESSAGE (OUTPATIENT)
Dept: RESEARCH | Facility: HOSPITAL | Age: 76
End: 2021-05-12

## 2022-05-05 ENCOUNTER — PATIENT MESSAGE (OUTPATIENT)
Dept: RESEARCH | Facility: HOSPITAL | Age: 77
End: 2022-05-05
Payer: MEDICARE

## 2023-01-24 ENCOUNTER — OUTSIDE PLACE OF SERVICE (OUTPATIENT)
Dept: PULMONOLOGY | Facility: CLINIC | Age: 78
End: 2023-01-24
Payer: COMMERCIAL

## 2023-01-24 PROCEDURE — 99232 PR SUBSEQUENT HOSPITAL CARE,LEVL II: ICD-10-PCS | Mod: FS,,, | Performed by: INTERNAL MEDICINE

## 2023-01-24 PROCEDURE — 99232 SBSQ HOSP IP/OBS MODERATE 35: CPT | Mod: FS,,, | Performed by: INTERNAL MEDICINE
